# Patient Record
Sex: MALE | Race: WHITE | NOT HISPANIC OR LATINO | URBAN - METROPOLITAN AREA
[De-identification: names, ages, dates, MRNs, and addresses within clinical notes are randomized per-mention and may not be internally consistent; named-entity substitution may affect disease eponyms.]

---
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
## 2024-03-19 ENCOUNTER — EMERGENCY (EMERGENCY)
Facility: HOSPITAL | Age: 19
LOS: 1 days | Discharge: HOME | End: 2024-03-19
Attending: EMERGENCY MEDICINE
Payer: MEDICAID

## 2024-03-19 VITALS
TEMPERATURE: 97 F | RESPIRATION RATE: 17 BRPM | HEART RATE: 80 BPM | SYSTOLIC BLOOD PRESSURE: 110 MMHG | OXYGEN SATURATION: 99 % | DIASTOLIC BLOOD PRESSURE: 70 MMHG

## 2024-03-19 VITALS
SYSTOLIC BLOOD PRESSURE: 108 MMHG | TEMPERATURE: 98 F | HEIGHT: 60 IN | RESPIRATION RATE: 20 BRPM | WEIGHT: 134.92 LBS | OXYGEN SATURATION: 100 % | DIASTOLIC BLOOD PRESSURE: 74 MMHG | HEART RATE: 83 BPM

## 2024-03-19 DIAGNOSIS — Z92.89 PERSONAL HISTORY OF OTHER MEDICAL TREATMENT: Chronic | ICD-10-CM

## 2024-03-19 PROCEDURE — 99284 EMERGENCY DEPT VISIT MOD MDM: CPT

## 2024-03-19 PROCEDURE — 96374 THER/PROPH/DIAG INJ IV PUSH: CPT

## 2024-03-19 PROCEDURE — 99284 EMERGENCY DEPT VISIT MOD MDM: CPT | Mod: 25

## 2024-03-19 PROCEDURE — 96375 TX/PRO/DX INJ NEW DRUG ADDON: CPT

## 2024-03-19 RX ORDER — FAMOTIDINE 10 MG/ML
1 INJECTION INTRAVENOUS
Qty: 10 | Refills: 0
Start: 2024-03-19 | End: 2024-03-28

## 2024-03-19 RX ORDER — EPINEPHRINE 0.3 MG/.3ML
0.3 INJECTION INTRAMUSCULAR; SUBCUTANEOUS
Qty: 1 | Refills: 0
Start: 2024-03-19 | End: 2024-03-19

## 2024-03-19 RX ORDER — FAMOTIDINE 10 MG/ML
20 INJECTION INTRAVENOUS ONCE
Refills: 0 | Status: COMPLETED | OUTPATIENT
Start: 2024-03-19 | End: 2024-03-19

## 2024-03-19 RX ADMIN — Medication 125 MILLIGRAM(S): at 16:39

## 2024-03-19 RX ADMIN — FAMOTIDINE 20 MILLIGRAM(S): 10 INJECTION INTRAVENOUS at 16:39

## 2024-03-19 NOTE — ED PROVIDER NOTE - PROGRESS NOTE DETAILS
pt improved. at baseline per father. advised pcp follow up will rx prednisone, pepcid and advise benadryl . pt father verbalized understanding and agreement with plan and dx. pt advised on next step and when/where to follow up. pt advised on all take home and otc medications. pt advised to follow up with PMD. pt advised to return to ed for worsenng symptoms including fever, cp, sob. will dc.

## 2024-03-19 NOTE — ED PROVIDER NOTE - CLINICAL SUMMARY MEDICAL DECISION MAKING FREE TEXT BOX
Patient brought in by EMS for allergic reaction.  Per EMS report patient was at home ate cheddar chips developed diffuse itching shortness of breath and wheezing was given 50 mg of Benadryl without improvement so was then given an EpiPen with improvement of his symptoms.    Plan Pepcid Solu-Medrol and observe    Differential including but limited to allergic reaction to food no evidence of anaphylaxis at this time Patient brought in by EMS for allergic reaction.  Per EMS report patient has history of dairy allergy was at group home ate cheddar chips developed diffuse itching shortness of breath and wheezing was given 50 mg of Benadryl without improvement so was then given an EpiPen with improvement of his symptoms.    Plan Pepcid Solu-Medrol and observe    Differential including but limited to allergic reaction to food no evidence of anaphylaxis at this time

## 2024-03-19 NOTE — ED ADULT TRIAGE NOTE - PAIN: PRESENCE, MLM
non-verbal indicators absent (Rating = 0)
verbal cues/nonverbal cues (demo/gestures)/1 person assist

## 2024-03-19 NOTE — ED ADULT NURSE NOTE - NSFALLHARMRISKINTERV_ED_ALL_ED
Assistance OOB with selected safe patient handling equipment if applicable/Communicate risk of Fall with Harm to all staff, patient, and family/Provide visual cue: red socks, yellow wristband, yellow gown, etc/Reinforce activity limits and safety measures with patient and family/Bed in lowest position, wheels locked, appropriate side rails in place/Call bell, personal items and telephone in reach/Instruct patient to call for assistance before getting out of bed/chair/stretcher/Non-slip footwear applied when patient is off stretcher/[place] to call system/Physically safe environment - no spills, clutter or unnecessary equipment/Purposeful Proactive Rounding/Room/bathroom lighting operational, light cord in reach

## 2024-03-19 NOTE — ED PROVIDER NOTE - CARE PROVIDER_API CALL
Carline Mathis Ann  Internal Medicine  [address] [place], [place] [address]  Phone: [phone]  Fax: [phone]  Follow Up Time: 4-6 Days

## 2024-03-19 NOTE — ED PROVIDER NOTE - NSICDXPASTMEDICALHX_GEN_ALL_CORE_FT
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  Subsequent cardiac valuations from 8924-7839 revealed no residual cardiac effects.  Last cardiac eval in  08/2017    Pneumonia 2005    Seizure disorder Last seizure 09/2020  follows with Darwin Jacobo

## 2024-03-19 NOTE — ED PROVIDER NOTE - NSFOLLOWUPINSTRUCTIONS_ED_ALL_ED_FT
1. FOLLOW UP WITH YOUR PRIMARY DOCTOR IN 24-48 HOURS.   2. FOLLOW UP WITH ALL SPECIALIST DISCUSSED DURING YOUR VISIT.   3. TAKE ALL MEDICATIONS PRESCRIBED IN THE ER IF ANY ARE PRESCRIBED. CONTINUE YOUR HOME MEDICATIONS UNLESS OTHERWISE ADVISED DIFFERENTLY.   4. RETURN FOR WORSENING SYMPTOMS OR CONCERNS INCLUDING BUT NOT LIMITED TO FEVER, CHEST PAIN, OR TROUBLE BREATHING OR ANY OTHER CONCERNS  prednisone 40mg once daily starting tomorrow  pepcid once daily starting tomorrow  benadryl as needed for allergy symptoms 25mg every 8 hours  your epi pen has been refilled     General Allergic Reaction    WHAT YOU NEED TO KNOW:    An allergic reaction is your body's response to an allergen. Allergens include medicines, food, insect stings, animal dander, mold, latex, chemicals, and dust mites. Pollen from trees, grass, and weeds can also cause an allergic reaction. An allergic reaction can range from mild to severe.    DISCHARGE INSTRUCTIONS:    Call 911 for signs or symptoms of anaphylaxis, such as trouble breathing, swelling in your mouth or throat, or wheezing. You may also have itching, a rash, hives, or feel like you are going to faint.    Return to the emergency department if:    You have a skin rash, hives, swelling, or itching that is starting to get worse.    Your throat tightens, or your lips or tongue swell.    You have trouble swallowing or speaking.    You have worsening nausea, diarrhea, or abdominal cramps, or you are vomiting.    You have chest pain or tightness.  Contact your healthcare provider if:    You have questions or concerns about your condition or care.    Medicines: You may need any of the following:    Medicines may be given to relieve certain allergy symptoms such as itching, sneezing, and swelling. You may take them as a pill or use drops in your nose or eyes. Topical treatments may be given to put directly on your skin to help decrease itching or swelling.    Epinephrine may be prescribed if you are at risk for anaphylaxis. This is a severe allergic reaction that can be life-threatening. Your healthcare provider will tell you if you need to keep epinephrine with you. You will be taught when and how to use it.    Take your medicine as directed. Contact your healthcare provider if you think your medicine is not helping or if you have side effects. Tell your provider if you are allergic to any medicine. Keep a list of the medicines, vitamins, and herbs you take. Include the amounts, and when and why you take them. Bring the list or the pill bottles to follow-up visits. Carry your medicine list with you in case of an emergency.  Follow up with your doctor as directed: Write down your questions so you remember to ask them during your visits.    Manage your symptoms:    Avoid allergens. You may need to have allergy testing with your healthcare provider or a specialist to find your allergens.    Use cold compresses on your skin or eyes. This will help soothe skin or eyes affected by the allergic reaction. You can make a cold compress by soaking a washcloth in cool water. Wring out the extra water before you apply the washcloth.    Rinse your nasal passages with a saline solution. Daily rinsing may help clear allergens out of your nose. Use distilled water if possible. You can also boil tap water and then let it cool before you use it. Do not use tap water without boiling it first.    Do not smoke. Nicotine and other chemicals in cigarettes and cigars can make an allergic reaction worse, and can also cause lung damage. Ask your healthcare provider for information if you currently smoke and need help to quit. E-cigarettes or smokeless tobacco still contain nicotine. Talk to your healthcare provider before you use these products.  © [employer] 1973, 2024    	  back to top            © [employer] 1973, 2024

## 2024-03-19 NOTE — ED PROVIDER NOTE - DIFFERENTIAL DIAGNOSIS
Differential Diagnosis Differential including but limited to allergic reaction to food no evidence of anaphylaxis at this time

## 2024-03-19 NOTE — ED ADULT NURSE NOTE - NSICDXPASTMEDICALHX_GEN_ALL_CORE_FT
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  Subsequent cardiac valuations from 8459-2401 revealed no residual cardiac effects.  Last cardiac eval in  08/2017    Pneumonia 2005    Seizure disorder Last seizure 09/2020  follows with Darwin Jacobo

## 2024-03-19 NOTE — ED ADULT NURSE NOTE - OBJECTIVE STATEMENT
patient BIBA from day program where he was given a cheese cracker and began having an allergic reaction, broke out in hives and had wheezing per ems. given PO benadryl and IM epi PTA. baseline patient is minimally verbal due to hx of autism. group home aide and father at bedside.

## 2024-03-19 NOTE — ED PROVIDER NOTE - NSICDXPASTSURGICALHX_GEN_ALL_CORE_FT
PAST SURGICAL HISTORY:  Circumcision     History of dental surgery 2013    Inguinal hernia [facility] @ [facility] [place]

## 2024-03-19 NOTE — ED ADULT NURSE NOTE - NSICDXPASTSURGICALHX_GEN_ALL_CORE_FT
PAST SURGICAL HISTORY:  Circumcision     History of dental surgery 2013    Inguinal hernia [facility] @ [facility] [place]

## 2024-03-19 NOTE — ED PROVIDER NOTE - OBJECTIVE STATEMENT
Patient is a 18-year-old male brought in by EMS from a group home through [facility] autistic nonverbal presents with allergic reaction.  Per group home aide patient ate chips with dairy in them which she is highly allergic to.  Group home aide reports that patient had lip swelling and wheezing.  Patient was given an EpiPen and 50 mg of Benadryl p.o. prior to arrival.  Patient unable to give further information.

## 2024-03-19 NOTE — ED PROVIDER NOTE - PATIENT PORTAL LINK FT
You can access the FollowMyHealth Patient Portal offered by [facility] by registering at the following website: [email or web address]. By joining [facility]’s FollowMyHealth portal, you will also be able to view your health information using other applications (apps) compatible with our system.

## 2024-03-19 NOTE — ED ADULT TRIAGE NOTE - CHIEF COMPLAINT QUOTE
autistic and non verbal given cheddar chips and developed rash and wheezing. given 50 mg benadryl po and epi injection

## 2024-03-20 PROBLEM — J45.909 UNSPECIFIED ASTHMA, UNCOMPLICATED: Chronic | Status: ACTIVE | Noted: 2017-11-29

## 2024-07-09 ENCOUNTER — NON-APPOINTMENT (OUTPATIENT)
Age: 19
End: 2024-07-09

## 2025-02-14 ENCOUNTER — NON-APPOINTMENT (OUTPATIENT)
Age: 20
End: 2025-02-14

## 2025-05-05 NOTE — H&P PST PEDIATRIC - PSH
I sent in a small refill. This is the last narcotic refill we are able to provide. It should only be taken when pain is severe 10/10. Continue to utilize the celerbex and tylenol  Circumcision    History of dental surgery  2013  Inguinal hernia  [facility] @ [facility] [place]

## 2025-06-18 NOTE — ED PROVIDER NOTE - SKIN
[de-identified] : 4 year old male presents for evaluation of emesis, NB/NB.  Complaints of heartburn with lying flat a few times a week. Eats dinner at 530pm and bed time is 730pm. Eats a variety of foods and textures. Emesis can occur at any time of day, 4-5 times a week, less for the last week, NB/NB, food contents. Abdominal pian after vomiting. Coughs twice usually prior to emesis. Seemingly a mouth breather, often sounds congested. Snores at night. BM are daily, Harding 3-6, no visible blood or mucous. No recurrent fevers or illnesses, weight loss, rashes, mouth ulcers, joint pains. No cyanosis, no pallor, no jaundice, no rash No cyanosis, no pallor, no jaundice, no rash.  no bruising, abrasions, or lacerations